# Patient Record
Sex: FEMALE | Race: ASIAN
[De-identification: names, ages, dates, MRNs, and addresses within clinical notes are randomized per-mention and may not be internally consistent; named-entity substitution may affect disease eponyms.]

---

## 2017-02-16 ENCOUNTER — HOSPITAL ENCOUNTER (OUTPATIENT)
Dept: HOSPITAL 45 - C.MRIBC | Age: 47
Discharge: HOME | End: 2017-02-16
Attending: FAMILY MEDICINE
Payer: COMMERCIAL

## 2017-02-16 DIAGNOSIS — M54.9: ICD-10-CM

## 2017-02-16 DIAGNOSIS — R19.00: Primary | ICD-10-CM

## 2017-02-16 NOTE — DIAGNOSTIC IMAGING REPORT
MRI ABDOMEN WITHOUT CONTRAST



CLINICAL HISTORY: PALPI TABLE DENSITY LEFT FLANK mass



TECHNIQUE: Imaging was performed without IV contrast enhancement.



COMPARISON STUDY:  No previous studies for comparison.



FINDINGS: Multi axial acquisition of the structures of the abdomen were

acquired. Markers placed over a clinically palpable nodule in the left

paraspinal location.



Signal characteristics of the soft tissue structures including subcutaneous fat

and paraspinal musculature appear uniform. Signal characteristics the osseous

structures are unremarkable. Kidneys are uniform in appearance. Liver spleen and

pancreas are unremarkable.



There is no evidence for mass or collection based on this study.



IMPRESSION:  Negative study. Specifically, no evidence for mass or collection at

the site of clinically palpable nodularity by MRI criteria 







Electronically signed by:  Damian Sanches M.D.

2/16/2017 5:23 PM



Dictated Date/Time:  2/16/2017 5:19 PM

## 2017-05-01 ENCOUNTER — HOSPITAL ENCOUNTER (OUTPATIENT)
Dept: HOSPITAL 45 - C.PAPS | Age: 47
Discharge: HOME | End: 2017-05-01
Attending: OBSTETRICS & GYNECOLOGY
Payer: COMMERCIAL

## 2017-05-01 DIAGNOSIS — Z01.419: Primary | ICD-10-CM

## 2017-05-12 NOTE — HISTORY & PHYSICAL EXAMINATION
DATE OF ADMISSION:  05/15/2017

 

CHIEF COMPLAINT:  Embedded IUD.

 

HISTORY OF PRESENT ILLNESS:  The patient is a 46-year-old female  3,

para 2-0-1-2 for removal of IUD.  This IUD was inserted in 2012, 2

months postpartum.  It was inserted prior to the patient moving to the [facility].  The Mirena has  and the patient wishes to have it

removed.  This was attempted 2017 by me in the office and I could 

grasp the strings and apply tension to the strings of the IUD, but the IUD

did not seem to move and did not come out.  An ultrasound was then

obtained and this showed that one of the arms of the IUD appears to be

embedded in the anterior uterine wall.  This was discussed with the patient

and a decision made to attempt removal hysteroscopically.  The patient

returned and was seen on 2017 for a preoperative exam and for Pap

smear.  Pap smear was read as negative.

 

PAST MEDICAL HISTORY:  

 

ALLERGIES:  THE PATIENT REPORTS VOMITING WITH CODEINE AND VICODIN.

 

MEDICATIONS:  The patient is using fluticasone propionate nasal suspension 2

sprays in each nostril once daily.  She also takes desloratadine once daily. 

She reports she is using an allergy eye drop and also takes Osteo Bi-Flex.

 

PAST SURGICAL HISTORY:  She has a history of D&C.  She has also had surgery

on her right knee.  This was for removal of a cyst.  In addition right

shoulder surgery for her rotator cuff.  She has also had  tooth

extraction performed.

 

ILLNESSES:  She reports she had pneumonia last year.  She has also had a

history of urinary tract infection.

 

FAMILY HISTORY:  Her mother had asthma and hearing loss.  She also reports

that she had an adverse anesthesia outcome.  Her  father has diabetes, high

cholesterol, hypertension and prostate cancer.  Her maternal uncle had lung

cancer.

 

SOCIAL HISTORY:  The patient is .  She denies smoking cigarettes.  She

drinks about 5 drinks of alcohol a week.

 

PHYSICAL EXAMINATION:

VITAL SIGNS:  Height 5 foot 5-1/4 inches, weight 123 pounds, blood pressure

104/66.

HEAD, EYES, EARS, NOSE, AND THROAT:  Grossly within normal limits.

NECK:  Supple without masses.

CHEST:  Her lungs are clear.

HEART:  Regular rate and rhythm.  No murmurs, gallops or rubs.

BREASTS:  Nontender with no masses palpable.  No nipple change and no

lymphadenopathy.

ABDOMEN:  Soft and nontender with no masses and no hepatosplenomegaly.

PELVIC:  External genitalia normal.  Vagina pink and stimulated.  Cervix pink

and closed.  IUD strings are noted.  Uterus is normal size.  There are no

adnexal masses or tenderness.

EXTREMITIES:  No cyanosis, clubbing or edema.

 

IMPRESSION:  A 46-year-old  3, para 2-0-1-2 with an embedded IUD. 

This cannot be removed in the office.

 

PLAN:  She is for hysteroscopy, dilation of the cervix and curettage with

possible removal of polyp/lesion and removal of IUD.  The patient is aware of

the risks of infection, bleeding, perforation of the uterus, which may

require additional surgery or treatment, pain, failure of the procedure,

retained pieces of IUD requiring additional surgery or treatment,

hospitalization and risk of anesthesia.  The patient wishes to proceed with

removal of the IUD hysteroscopically.

 

 

 

MTDD

## 2017-05-15 ENCOUNTER — HOSPITAL ENCOUNTER (OUTPATIENT)
Dept: HOSPITAL 45 - X.SURG | Age: 47
Discharge: HOME | End: 2017-05-15
Attending: OBSTETRICS & GYNECOLOGY
Payer: COMMERCIAL

## 2017-05-15 VITALS
HEIGHT: 65 IN | HEIGHT: 65 IN | WEIGHT: 120.26 LBS | WEIGHT: 120.26 LBS | BODY MASS INDEX: 20.04 KG/M2 | BODY MASS INDEX: 20.04 KG/M2

## 2017-05-15 VITALS — DIASTOLIC BLOOD PRESSURE: 77 MMHG | OXYGEN SATURATION: 100 % | SYSTOLIC BLOOD PRESSURE: 112 MMHG | HEART RATE: 55 BPM

## 2017-05-15 VITALS — TEMPERATURE: 97.52 F

## 2017-05-15 DIAGNOSIS — T83.32XA: Primary | ICD-10-CM
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
## 2017-05-15 NOTE — ANESTHESIA PROGRESS NT - [facility]
Anesthesia Post Op Note


Date & Time


May 15, 2017 at 10:03





Vital Signs


Pain Intensity:  0





 Vital Signs Past 12 Hours








  Date Time  Temp Pulse Resp B/P Pulse Ox O2 Delivery O2 Flow Rate FiO2


 


5/15/17 10:00  55 16 112/77 100 Room Air  


 


5/15/17 09:43 36.4 60 16 117/81 100 Room Air  


 


5/15/17 09:26  56 12  100   


 


5/15/17 09:26  54 12     


 


5/15/17 09:25    116/80    


 


5/15/17 09:21  51 11  100   


 


5/15/17 09:21  51 11     


 


5/15/17 09:20    108/76    


 


5/15/17 09:16  53 8     


 


5/15/17 09:16  53 8  100   


 


5/15/17 09:15    120/66    


 


5/15/17 09:15    120/66    


 


5/15/17 09:13  53 12     


 


5/15/17 09:13  53 12     


 


5/15/17 09:13  53 12  100   


 


5/15/17 09:13  53 12  100   


 


5/15/17 09:12 37.0    100 Room Air  


 


5/15/17 09:10    110/76    


 


5/15/17 09:10    110/76    


 


5/15/17 09:08  51 9     


 


5/15/17 09:08  51 9     


 


5/15/17 09:08  51 9  100   


 


5/15/17 09:08  51 9  100   


 


5/15/17 09:07  54 8     


 


5/15/17 09:07  54 8     


 


5/15/17 09:07  54 8  100   


 


5/15/17 09:07  54 8  100   


 


5/15/17 09:05    111/79    


 


5/15/17 09:05    111/79    


 


5/15/17 09:02  50 12  100   


 


5/15/17 09:02  50 12  100   


 


5/15/17 09:02  50 12     


 


5/15/17 09:02  50 12     


 


5/15/17 09:00    107/70    


 


5/15/17 09:00    107/70    


 


5/15/17 08:57  52 18     


 


5/15/17 08:57  53 18  100   


 


5/15/17 08:57  52 18     


 


5/15/17 08:57  53 18  100   


 


5/15/17 08:55    108/63    


 


5/15/17 08:55    108/63    


 


5/15/17 08:52  49 26     


 


5/15/17 08:52  49 26     


 


5/15/17 08:52  48 26  100   


 


5/15/17 08:52  48 26  100   


 


5/15/17 08:50    105/67    


 


5/15/17 08:50    105/67    


 


5/15/17 08:47  49 24  100   


 


5/15/17 08:47  49 24     


 


5/15/17 08:47  49 24     


 


5/15/17 08:47  49 24  100   


 


5/15/17 08:45    99/65    


 


5/15/17 08:45    99/65    


 


5/15/17 08:42  50 22     


 


5/15/17 08:42  50 22     


 


5/15/17 08:42  49 22  100   


 


5/15/17 08:42  49 22  100   


 


5/15/17 08:40    101/61    


 


5/15/17 08:40    101/61    


 


5/15/17 08:37  51 10  100   


 


5/15/17 08:37  51 10  100   


 


5/15/17 08:37  51 10     


 


5/15/17 08:37  51 10     


 


5/15/17 08:35    108/62    


 


5/15/17 08:35    108/62    


 


5/15/17 08:32  61  94/69 100   


 


5/15/17 08:32 36.5 61 12 94/69 98 Mask 6 


 


5/15/17 08:32  61  94/69 100   


 


5/15/17 08:32  61      


 


5/15/17 08:32  61      


 


5/15/17 06:43 36.8 65 16 109/51 96 Room Air  











Notes


Mental Status:  alert / awake / arousable, participated in evaluation


Pt Amnestic to Procedure:  Yes


Nausea / Vomiting:  adequately controlled


Pain:  adequately controlled


Airway Patency, RR, SpO2:  stable & adequate


BP & HR:  stable & adequate


Hydration State:  stable & adequate


Anesthetic Complications:  no major complications apparent

## 2017-05-15 NOTE — DISCHARGE INSTRUCTIONS-SURGCTR
Discharge Instructions


Date of Service


May 15, 2017.





Visit


Reason for Visit:  Embedded Intrauterine Device





Discharge


Discharge Diagnosis / Problem:  S/P Removal of embedded Intrauterine Device





Discharge Goals


Goal(s):  Therapeutic intervention





Activity Recommendations


Activity Limitations:  per Instructions/Follow-up section


ACTIVITY RECOMMENDATIONS:





*  Avoid tampons, douching, hot tubs, pools, and intercourse until bleeding has 

stopped.


*  May shower as usual.


*  No strenuous activity for 48 hours.  








SPECIAL CARE INSTRUCTIONS:





Special Diet:





*  Mild nausea may occur in the immediate post-operative period.  


*  Take clear liquids such as tea, cola or bouillon until all nausea has 

subsided; you may


    then resume your normal diet.





Special Care:





*  Light bleeding and vaginal spotting can last from a few days to 3-4 weeks.


   Call your doctor if bleeding becomes heavier than the heaviest part of your 

period.


*  Check your temperature twice a day for one week.  If it goes above 100.4 

degrees Fahrenheit


   (38.0 Celsius), notify your doctor. Call if you develop a foul vaginal 

discharge or have 


   persistent severe cramping.





*  Call your doctor's office for an appointment for 2-4 weeks after your 

surgery. [phone]








FOLLOW-UP VISIT:





Call your doctor's office for an appointment for 2-4 weeks after your surgery.





Anesthesia


.





Post Anesthesia Instructions:





If you have had General Anesthesia or IV Sedation:





*  Do not drive today.


*  Resume driving when surgeon permits.


*  Do not make important decisions or sign legal documents today.


*  Call surgeon for:





   1.  Temperature elevations greater than 101 degrees F.


   2.  Uncontrollable pain.


   3.  Excessive bleeding.


   4.  Persistent nausea and vomiting.


   5.  Medication intolerance (nausea, vomiting or rash).





*  For nausea and vomiting use only clear liquids such as: tea, soda, bouillon 

until nausea subsides, then gradually increase diet as tolerated.





*  If you have any concerns or questions, call your surgeon's office.  If 

physician is unavailable and it is an emergency, call 911 or go to the nearest 

emergency room.





.





Diet Recommendations


Home Diet:  resume previous diet





Procedures


Procedures Performed:  


Hysteroscopy, Removal of Intrauterine Device





Pending Studies


Studies pending at discharge:  no





Medical Emergencies








.


Who to Call and When:





Medical Emergencies:  If at any time you feel your situation is an emergency, 

please call 911 immediately.





.





Non-Emergent Contact


Non-Emergency issues call your:  Gynecologist


Call Non-Emergent contact if:  temperature is above 100.5, your pain is not 

controlled





.


.





"Provider Documentation" section prepared by Madai Olivera.








.

## 2017-05-15 NOTE — OPERATIVE REPORT
DATE OF OPERATION:  05/15/2017

 

PREOPERATIVE DIAGNOSIS:  Embedded intrauterine device.

 

POSTOPERATIVE DIAGNOSIS:  Same.

 

PROCEDURE PERFORMED:  Hysteroscopy and removal of intrauterine device.

 

SURGEON:  Dr. Madai Olivera.

 

ANESTHESIA:  General.

 

ANESTHETIST:  Dr. Garay.

 

DESCRIPTION OF PROCEDURE:  The patient was taken to the operating room where

general anesthesia was administered.  After an adequate level was obtained,

she was placed in dorsal lithotomy position.  Vulva, vagina, and cervix were

prepped with Betadine solution.  The patient was draped.  Bladder was drained

with a straight catheter.  Weighted speculum was placed in the posterior

fornix of the vagina after bimanual exam was done.  Bimanual exam confirmed a

retroverted uterus.  The cervix was then dilated to a #25.  It should be

noted that the IUD strings were not visible.  Hysteroscope was then inserted

into the uterus and the IUD visualized within the cavity.  It appeared that

both arms of the IUD were embedded although the distal portion of one of the

arms was visible in the cavity.  Photo was taken.  The hysteroscope was

removed.  A Benita clamp was used to grasp the strings of the IUD. 

Hysteroscope was reinserted and an attempt made to grasp one of the arms of

the IUD with the small operative grasper.  This was not successful.  The Benita

clamp was removed from the IUD strings and then used to grasp the IUD itself.

The IUD was successfully removed intact.  Hysteroscope was then used to

inspect the endometrial cavity.  There was distortion at the upper portion of

the cavity where the IUD had been embedded.  Otherwise, the cavity appeared

normal.  At this point, the procedure was ended.  Hysteroscope was removed. 

The patient was taken to the recovery room in good condition.

 

 

I attest to the content of the Intraoperative Record and any orders documented 
therein. Any exceptions are noted below.

 

 

 

MTDD

## 2017-10-05 ENCOUNTER — HOSPITAL ENCOUNTER (OUTPATIENT)
Dept: HOSPITAL 45 - C.LAB1850 | Age: 47
Discharge: HOME | End: 2017-10-05
Attending: OBSTETRICS & GYNECOLOGY
Payer: COMMERCIAL

## 2017-10-05 DIAGNOSIS — R39.9: Primary | ICD-10-CM

## 2017-10-05 LAB
APPEARANCE UR: CLEAR
BACTERIA #/AREA URNS HPF: (no result) /[HPF]
BILIRUB UR-MCNC: (no result) MG/DL
COLOR UR: YELLOW
MANUAL MICROSCOPIC REQUIRED?: YES
NITRITE UR QL STRIP: (no result)
PH UR STRIP: 6.5 [PH] (ref 4.5–7.5)
RBC #/AREA URNS HPF: (no result) /HPF (ref 0–4)
REVIEW REQ?: NO
SP GR UR STRIP: 1.01 (ref 1–1.03)
UROBILINOGEN UR-MCNC: (no result) MG/DL
WBC #/AREA URNS HPF: >30 /HPF (ref 0–5)

## 2017-12-11 ENCOUNTER — HOSPITAL ENCOUNTER (OUTPATIENT)
Dept: HOSPITAL 45 - C.LAB1850 | Age: 47
Discharge: HOME | End: 2017-12-11
Attending: OBSTETRICS & GYNECOLOGY
Payer: COMMERCIAL

## 2017-12-11 DIAGNOSIS — R39.9: Primary | ICD-10-CM

## 2017-12-11 LAB
APPEARANCE UR: CLEAR
BACTERIA #/AREA URNS HPF: (no result) /[HPF]
BILIRUB UR-MCNC: (no result) MG/DL
COLOR UR: YELLOW
MANUAL MICROSCOPIC REQUIRED?: YES
NITRITE UR QL STRIP: (no result)
PH UR STRIP: 7 [PH] (ref 4.5–7.5)
RBC #/AREA URNS HPF: (no result) /HPF (ref 0–4)
REVIEW REQ?: NO
SP GR UR STRIP: 1.01 (ref 1–1.03)
URINE BILL WITH OR WITHOUT MIC: (no result)
UROBILINOGEN UR-MCNC: (no result) MG/DL
WBC #/AREA URNS HPF: >30 /HPF (ref 0–5)

## 2017-12-20 ENCOUNTER — HOSPITAL ENCOUNTER (OUTPATIENT)
Dept: HOSPITAL 45 - C.PATHSPEC | Age: 47
Discharge: HOME | End: 2017-12-20
Attending: UROLOGY
Payer: COMMERCIAL

## 2017-12-20 DIAGNOSIS — R30.0: Primary | ICD-10-CM

## 2017-12-20 DIAGNOSIS — R31.29: ICD-10-CM

## 2018-03-28 ENCOUNTER — HOSPITAL ENCOUNTER (OUTPATIENT)
Dept: HOSPITAL 45 - C.LAB1850 | Age: 48
Discharge: HOME | End: 2018-03-28
Attending: OBSTETRICS & GYNECOLOGY
Payer: COMMERCIAL

## 2018-03-28 DIAGNOSIS — R39.9: Primary | ICD-10-CM
